# Patient Record
Sex: FEMALE | URBAN - METROPOLITAN AREA
[De-identification: names, ages, dates, MRNs, and addresses within clinical notes are randomized per-mention and may not be internally consistent; named-entity substitution may affect disease eponyms.]

---

## 2022-10-11 ENCOUNTER — APPOINTMENT (OUTPATIENT)
Dept: URBAN - METROPOLITAN AREA CLINIC 268 | Age: 52
Setting detail: DERMATOLOGY
End: 2022-10-11

## 2022-10-11 VITALS — WEIGHT: 95 LBS | HEIGHT: 63 IN

## 2022-10-11 DIAGNOSIS — L30.8 OTHER SPECIFIED DERMATITIS: ICD-10-CM

## 2022-10-11 DIAGNOSIS — L21.8 OTHER SEBORRHEIC DERMATITIS: ICD-10-CM

## 2022-10-11 PROCEDURE — 99203 OFFICE O/P NEW LOW 30 MIN: CPT

## 2022-10-11 PROCEDURE — OTHER PRESCRIPTION MEDICATION MANAGEMENT: OTHER

## 2022-10-11 PROCEDURE — OTHER PRESCRIPTION: OTHER

## 2022-10-11 PROCEDURE — OTHER COUNSELING: OTHER

## 2022-10-11 PROCEDURE — OTHER REASSURANCE: OTHER

## 2022-10-11 PROCEDURE — OTHER MIPS QUALITY: OTHER

## 2022-10-11 RX ORDER — TRIAMCINOLONE ACETONIDE 1 MG/G
CREAM TOPICAL BID
Qty: 453.6 | Refills: 1 | Status: ERX | COMMUNITY
Start: 2022-10-11

## 2022-10-11 RX ORDER — AMMONIUM LACTATE 12 G/100G
LOTION TOPICAL
Qty: 225 | Refills: 1 | Status: ERX | COMMUNITY
Start: 2022-10-11

## 2022-10-11 RX ORDER — HYDROCORTISONE 25 MG/G
CREAM TOPICAL
Qty: 30 | Refills: 1 | Status: ERX | COMMUNITY
Start: 2022-10-11

## 2022-10-11 RX ORDER — KETOCONAZOLE 20 MG/ML
SHAMPOO, SUSPENSION TOPICAL
Qty: 120 | Refills: 3 | Status: ERX | COMMUNITY
Start: 2022-10-11

## 2022-10-11 ASSESSMENT — LOCATION SIMPLE DESCRIPTION DERM
LOCATION SIMPLE: LEFT POSTERIOR UPPER ARM
LOCATION SIMPLE: LEFT FOREARM
LOCATION SIMPLE: LEFT CALF
LOCATION SIMPLE: RIGHT PRETIBIAL REGION
LOCATION SIMPLE: RIGHT CHEEK
LOCATION SIMPLE: RIGHT THIGH
LOCATION SIMPLE: LEFT CHEEK
LOCATION SIMPLE: RIGHT FOREHEAD
LOCATION SIMPLE: RIGHT UPPER ARM
LOCATION SIMPLE: LEFT FOREHEAD
LOCATION SIMPLE: LEFT POSTERIOR THIGH
LOCATION SIMPLE: LEFT THIGH
LOCATION SIMPLE: RIGHT POSTERIOR UPPER ARM
LOCATION SIMPLE: RIGHT FOREARM
LOCATION SIMPLE: LEFT PRETIBIAL REGION
LOCATION SIMPLE: LEFT THIGH
LOCATION SIMPLE: CHIN
LOCATION SIMPLE: RIGHT UPPER ARM
LOCATION SIMPLE: RIGHT CALF
LOCATION SIMPLE: RIGHT THIGH
LOCATION SIMPLE: RIGHT POSTERIOR THIGH
LOCATION SIMPLE: LEFT UPPER ARM
LOCATION SIMPLE: RIGHT ELBOW

## 2022-10-11 ASSESSMENT — LOCATION DETAILED DESCRIPTION DERM
LOCATION DETAILED: LEFT ANTERIOR DISTAL THIGH
LOCATION DETAILED: RIGHT ANTERIOR DISTAL THIGH
LOCATION DETAILED: RIGHT FOREHEAD
LOCATION DETAILED: RIGHT ANTERIOR DISTAL THIGH
LOCATION DETAILED: LEFT ANTERIOR PROXIMAL UPPER ARM
LOCATION DETAILED: LEFT ANTERIOR DISTAL THIGH
LOCATION DETAILED: LEFT FOREHEAD
LOCATION DETAILED: RIGHT PROXIMAL PRETIBIAL REGION
LOCATION DETAILED: LEFT CENTRAL MALAR CHEEK
LOCATION DETAILED: LEFT CHIN
LOCATION DETAILED: RIGHT VENTRAL PROXIMAL FOREARM
LOCATION DETAILED: LEFT PROXIMAL DORSAL FOREARM
LOCATION DETAILED: RIGHT DISTAL RADIAL DORSAL FOREARM
LOCATION DETAILED: RIGHT ELBOW
LOCATION DETAILED: LEFT PROXIMAL POSTERIOR UPPER ARM
LOCATION DETAILED: RIGHT DISTAL LATERAL POSTERIOR THIGH
LOCATION DETAILED: LEFT DISTAL POSTERIOR THIGH
LOCATION DETAILED: RIGHT CENTRAL MALAR CHEEK
LOCATION DETAILED: LEFT PROXIMAL PRETIBIAL REGION
LOCATION DETAILED: RIGHT DISTAL POSTERIOR UPPER ARM
LOCATION DETAILED: RIGHT ANTERIOR DISTAL UPPER ARM
LOCATION DETAILED: RIGHT ANTERIOR DISTAL UPPER ARM
LOCATION DETAILED: LEFT PROXIMAL CALF
LOCATION DETAILED: LEFT VENTRAL PROXIMAL FOREARM
LOCATION DETAILED: RIGHT DISTAL CALF

## 2022-10-11 ASSESSMENT — LOCATION ZONE DERM
LOCATION ZONE: ARM
LOCATION ZONE: ARM
LOCATION ZONE: LEG
LOCATION ZONE: LEG
LOCATION ZONE: FACE

## 2022-10-11 NOTE — PROCEDURE: PRESCRIPTION MEDICATION MANAGEMENT
Initiate Treatment: ketoconazole 2 % shampoo 2-3 x week\\nQuantity: 120.0 ml  Days Supply: 30\\nSig: Use 2-3 times a week. Let sit for 10 minutes in shower and then rinse.\\n\\nhydrocortisone 2.5 % topical cream \\nQuantity: 30.0 g  Days Supply: 30\\nSig: Apply twice a day to patches of seborrheic dermatitis on face, ears and chest for up to two weeks as needed.
Detail Level: Zone
Render In Strict Bullet Format?: No
Continue Regimen: Sensitive skin care with dove body wash, non scented detergent and Eucerin moisturizer multiple times a day.
Initiate Treatment: Start using triamcinolone twice a day for 2 weeks to affected areas of arms and legs. Take a week off and then repeat as needed. \\nStart using lac hydrin lotion twice a day to affected areas of the arms and legs.